# Patient Record
(demographics unavailable — no encounter records)

---

## 2024-10-09 NOTE — DISCUSSION/SUMMARY
[de-identified] : SHE IS STATUS POST A LEFT TOTAL HIP REPLACEMENT 20 YEARS AGO WHICH IS DOING GREAT.  SHE IS STATUS POST A RIGHT KNEE REPLACEMENT ABOUT 8 YEARS AGO WHICH IS DOING GREAT.  SHE HAS LEFT KNEE PAIN AND RIGHT HIP PAIN.  SHE IS HERE WITH HER DAUGHTER AND SHE AND HER DAUGHTER AGREE THAT SHE HAS HAD A LIMP ON HER RIGHT SIDE FOR SOME TIME BUT SHE WAS ABLE TO WALK REASONABLY QUICKLY AND KEEP UP WITH THE REST OF THE FAMILY UNTIL THE LAST COUPLE OF MONTHS.  NOW THE PAIN IN THE RIGHT HIP AND THE LEFT KNEE MAKE HER WALK SLOWLY.  X-RAYS OF BOTH HIPS AND PELVIS TAKEN IN OUR OFFICE TODAY 2 VIEWS SHOW A WELL-FIXED LEFT HIP REPLACEMENT IN EXCELLENT POSITION.  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  SHE IS NOT PARTICULARLY TENDER BY HER GREATER TROCHES. LEFT KNEE EXAM THERE RANGE OF MOTION IS PRESERVED.  THEY ARE SLIGHTLY UNCOMFORTABLE AT THE PATELLOFEMORAL JOINT WITH COMPRESSION AND WITH RESISTED STRAIGHT LEG RAISING.  THERE IS A VERY MILD EFFUSION AND THEY DO HAVE JOINT LINE TENDERNESS MEDIAL GREATER THAN LATERAL.  ANTERIOR AND POSTERIOR DRAWER ARE NEGATIVE.  THE KNEE IS STABLE TO VARUS VALGUS STRESS THERE NEUROVASCULAR INTACT WITHOUT SIGNS OR SYMPTOMS OF DVT  I THINK HER MAIN PROBLEM IS DJD IN HER RIGHT HIP AND LEFT KNEE AND WE ARE GOING TO TRY HER ON MELOXICAM TYLENOL CALCIUM AND VITAMIN D AND IF THAT DOES NOT WORK SHE IS CAN TO TAKE 2 NAPROXEN TWICE A DAY OVER-THE-COUNTER FOR 10 DAYS AND IF THAT DOES NOT WORK SHE IS CAN TO TAKE 3 OVER-THE-COUNTER IBUPROFEN 3 TIMES A DAY FOR 10 DAYS.  IF THAT DOES NOT WORK THEN SHE IS GOING TO SEE OUR HIP REPLACEMENT AND KNEE REPLACEMENT SPECIALIST TO CONSIDER FURTHER JOINT REPLACEMENTS OVERALL PHYSICAL EXAM GENERAL: Well developed well nourished in no acute distress   MENTAL:Alert and oriented x3, normal affect, Pleasant and accompanied by family   MUSCULOSKELETAL: Ambulating independently   HEENT: NCAT, EOM intact, mucosa moist, neck supple with adequate free rom   CARDIOVASCULAR/HEMATOLOGICAL: no JVD, no peripheral edema, good capillary refill,  skin warm and well perfused, no venous stasis ulcers, pulses intact, no pallor or superficial non-traumatic bruising   NEUROLOGICAL: free passive rom without rigidity or cog wheel motion   RESPIRATORY: no gross stridor or wheezing or increased respiratory effort or use of O2, good capil refill and color   INTEGUMENTARY: skin intact without obvious suspicious lesions where examined OSTEOPENIA We discussed with them the maintenance of bone health through weightbearing activities and general health measures such as smoking cessation, diabetic control and proper weight maintenance.  We did advocate that they take vitamin D 1 to 5000 units a day and calcium citrate 500 mg a day.  This can be obtained over-the-counter.  We stressed that they should take calcium citrate not calcium carbonate which is more like carbon/chalk and is not as well absorbed.  We encourage them to speak to their primary care physician as regards the issue of whether or not they should get a bone density test and possibly be on adjunct of treatment such as Prolia, Fosamax etc.